# Patient Record
Sex: FEMALE | Race: WHITE
[De-identification: names, ages, dates, MRNs, and addresses within clinical notes are randomized per-mention and may not be internally consistent; named-entity substitution may affect disease eponyms.]

---

## 2018-05-23 ENCOUNTER — HOSPITAL ENCOUNTER (EMERGENCY)
Dept: HOSPITAL 94 - ER | Age: 83
Discharge: HOME | End: 2018-05-23
Payer: MEDICARE

## 2018-05-23 VITALS — DIASTOLIC BLOOD PRESSURE: 52 MMHG | SYSTOLIC BLOOD PRESSURE: 105 MMHG

## 2018-05-23 VITALS — HEIGHT: 61 IN | BODY MASS INDEX: 19.98 KG/M2 | WEIGHT: 105.82 LBS

## 2018-05-23 DIAGNOSIS — R00.2: Primary | ICD-10-CM

## 2018-05-23 DIAGNOSIS — I10: ICD-10-CM

## 2018-05-23 DIAGNOSIS — E11.9: ICD-10-CM

## 2018-05-23 DIAGNOSIS — I48.91: ICD-10-CM

## 2018-05-23 DIAGNOSIS — Z88.0: ICD-10-CM

## 2018-05-23 DIAGNOSIS — Z90.710: ICD-10-CM

## 2018-05-23 DIAGNOSIS — Z88.5: ICD-10-CM

## 2018-05-23 DIAGNOSIS — Z79.899: ICD-10-CM

## 2018-05-23 LAB
ALBUMIN SERPL BCP-MCNC: 3.7 G/DL (ref 3.4–5)
ALBUMIN/GLOB SERPL: 1.2 {RATIO} (ref 1.1–1.5)
ALP SERPL-CCNC: 70 IU/L (ref 46–116)
ALT SERPL W P-5'-P-CCNC: 47 U/L (ref 12–78)
ANION GAP SERPL CALCULATED.3IONS-SCNC: 8 MMOL/L (ref 8–16)
APTT PPP: 24 SECONDS (ref 22–32)
AST SERPL W P-5'-P-CCNC: 30 U/L (ref 10–37)
BASOPHILS # BLD AUTO: 0.1 X10'3 (ref 0–0.2)
BASOPHILS NFR BLD AUTO: 0.6 % (ref 0–1)
BILIRUB SERPL-MCNC: 0.5 MG/DL (ref 0.1–1)
BUN SERPL-MCNC: 23 MG/DL (ref 7–18)
BUN/CREAT SERPL: 25.8 (ref 6.6–38)
CALCIUM SERPL-MCNC: 9.2 MG/DL (ref 8.5–10.1)
CHLORIDE SERPL-SCNC: 105 MMOL/L (ref 99–107)
CO2 SERPL-SCNC: 29.2 MMOL/L (ref 24–32)
CREAT SERPL-MCNC: 0.89 MG/DL (ref 0.4–0.9)
EOSINOPHIL # BLD AUTO: 0.1 X10'3 (ref 0–0.9)
EOSINOPHIL NFR BLD AUTO: 1.4 % (ref 0–6)
ERYTHROCYTE [DISTWIDTH] IN BLOOD BY AUTOMATED COUNT: 13.7 % (ref 11.5–14.5)
ETHANOL SERPL-MCNC: < 0.01 GM/DL (ref 0–0.01)
GFR SERPL CREATININE-BSD FRML MDRD: 61 ML/MIN
GLUCOSE SERPL-MCNC: 141 MG/DL (ref 70–104)
HCT VFR BLD AUTO: 47.6 % (ref 35–45)
HGB BLD-MCNC: 16.1 G/DL (ref 12–16)
INR PPP: 1 INR
LYMPHOCYTES # BLD AUTO: 2 X10'3 (ref 1.1–4.8)
LYMPHOCYTES NFR BLD AUTO: 25.7 % (ref 21–51)
MAGNESIUM SERPL-MCNC: 2.3 MG/DL (ref 1.5–2.4)
MCH RBC QN AUTO: 31.6 PG (ref 27–31)
MCHC RBC AUTO-ENTMCNC: 33.7 % (ref 33–36.5)
MCV RBC AUTO: 93.7 FL (ref 78–98)
MONOCYTES # BLD AUTO: 0.6 X10'3 (ref 0–0.9)
MONOCYTES NFR BLD AUTO: 7.5 % (ref 2–12)
NEUTROPHILS # BLD AUTO: 5.1 X10'3 (ref 1.8–7.7)
NEUTROPHILS NFR BLD AUTO: 64.8 % (ref 42–75)
PLATELET # BLD AUTO: 287 X10'3 (ref 140–440)
PMV BLD AUTO: 7.6 FL (ref 7.4–10.4)
POTASSIUM SERPL-SCNC: 4.3 MMOL/L (ref 3.5–5.1)
PROT SERPL-MCNC: 6.9 G/DL (ref 6.4–8.2)
PROTHROMBIN TIME: 10.2 SECONDS (ref 9–12)
RBC # BLD AUTO: 5.09 X10'6 (ref 4.2–5.6)
SODIUM SERPL-SCNC: 142 MMOL/L (ref 135–145)
WBC # BLD AUTO: 7.9 X10'3 (ref 4.5–11)

## 2018-05-23 PROCEDURE — 71045 X-RAY EXAM CHEST 1 VIEW: CPT

## 2018-05-23 PROCEDURE — 36415 COLL VENOUS BLD VENIPUNCTURE: CPT

## 2018-05-23 PROCEDURE — 92960 CARDIOVERSION ELECTRIC EXT: CPT

## 2018-05-23 PROCEDURE — 80053 COMPREHEN METABOLIC PANEL: CPT

## 2018-05-23 PROCEDURE — 85730 THROMBOPLASTIN TIME PARTIAL: CPT

## 2018-05-23 PROCEDURE — 96374 THER/PROPH/DIAG INJ IV PUSH: CPT

## 2018-05-23 PROCEDURE — 85610 PROTHROMBIN TIME: CPT

## 2018-05-23 PROCEDURE — 85025 COMPLETE CBC W/AUTO DIFF WBC: CPT

## 2018-05-23 PROCEDURE — 84484 ASSAY OF TROPONIN QUANT: CPT

## 2018-05-23 PROCEDURE — 83735 ASSAY OF MAGNESIUM: CPT

## 2018-05-23 PROCEDURE — 93005 ELECTROCARDIOGRAM TRACING: CPT

## 2018-05-23 PROCEDURE — 96376 TX/PRO/DX INJ SAME DRUG ADON: CPT

## 2018-05-23 PROCEDURE — 99291 CRITICAL CARE FIRST HOUR: CPT

## 2018-05-23 PROCEDURE — 80320 DRUG SCREEN QUANTALCOHOLS: CPT

## 2018-05-23 RX ADMIN — METOPROLOL TARTRATE SCH MG: 5 INJECTION INTRAVENOUS at 07:45

## 2018-05-23 RX ADMIN — METOPROLOL TARTRATE SCH MG: 5 INJECTION INTRAVENOUS at 07:59

## 2018-05-23 RX ADMIN — METOPROLOL TARTRATE SCH MG: 5 INJECTION INTRAVENOUS at 10:37

## 2018-05-23 RX ADMIN — METOPROLOL TARTRATE SCH MG: 5 INJECTION INTRAVENOUS at 09:32

## 2018-05-23 RX ADMIN — METOPROLOL TARTRATE SCH MG: 5 INJECTION INTRAVENOUS at 07:29

## 2019-04-11 ENCOUNTER — HOSPITAL ENCOUNTER (INPATIENT)
Dept: HOSPITAL 94 - ER | Age: 84
DRG: 246 | End: 2019-04-11
Attending: INTERNAL MEDICINE | Admitting: INTERNAL MEDICINE
Payer: MEDICARE

## 2019-04-11 VITALS — DIASTOLIC BLOOD PRESSURE: 34 MMHG | SYSTOLIC BLOOD PRESSURE: 51 MMHG

## 2019-04-11 VITALS — DIASTOLIC BLOOD PRESSURE: 23 MMHG | SYSTOLIC BLOOD PRESSURE: 34 MMHG

## 2019-04-11 VITALS — SYSTOLIC BLOOD PRESSURE: 49 MMHG | DIASTOLIC BLOOD PRESSURE: 34 MMHG

## 2019-04-11 VITALS — HEIGHT: 61 IN | BODY MASS INDEX: 21.02 KG/M2 | WEIGHT: 111.33 LBS

## 2019-04-11 VITALS — SYSTOLIC BLOOD PRESSURE: 34 MMHG | DIASTOLIC BLOOD PRESSURE: 24 MMHG

## 2019-04-11 DIAGNOSIS — Z90.710: ICD-10-CM

## 2019-04-11 DIAGNOSIS — R57.0: ICD-10-CM

## 2019-04-11 DIAGNOSIS — I10: ICD-10-CM

## 2019-04-11 DIAGNOSIS — I21.19: Primary | ICD-10-CM

## 2019-04-11 DIAGNOSIS — I48.2: ICD-10-CM

## 2019-04-11 DIAGNOSIS — Z90.49: ICD-10-CM

## 2019-04-11 DIAGNOSIS — Z88.5: ICD-10-CM

## 2019-04-11 DIAGNOSIS — E11.9: ICD-10-CM

## 2019-04-11 DIAGNOSIS — J96.00: ICD-10-CM

## 2019-04-11 DIAGNOSIS — Z91.041: ICD-10-CM

## 2019-04-11 DIAGNOSIS — Z88.0: ICD-10-CM

## 2019-04-11 DIAGNOSIS — Z79.899: ICD-10-CM

## 2019-04-11 DIAGNOSIS — Z88.8: ICD-10-CM

## 2019-04-11 LAB
ALBUMIN SERPL BCP-MCNC: 3 G/DL (ref 3.4–5)
ALBUMIN/GLOB SERPL: 1.2 {RATIO} (ref 1.1–1.5)
ALP SERPL-CCNC: 99 IU/L (ref 46–116)
ALT SERPL W P-5'-P-CCNC: 229 U/L (ref 12–78)
ANION GAP SERPL CALCULATED.3IONS-SCNC: 19 MMOL/L (ref 8–16)
AST SERPL W P-5'-P-CCNC: 258 U/L (ref 10–37)
BASE EXCESS BLDA CALC-SCNC: -21.6 MMOL/L (ref -2–3)
BASOPHILS # BLD AUTO: 0.1 X10'3 (ref 0–0.2)
BASOPHILS NFR BLD AUTO: 1.2 % (ref 0–1)
BILIRUB SERPL-MCNC: 0.4 MG/DL (ref 0.1–1)
BODY TEMPERATURE: 37
BUN SERPL-MCNC: 9 MG/DL (ref 7–18)
BUN/CREAT SERPL: 7.6 (ref 6.6–38)
CALCIUM SERPL-MCNC: 9 MG/DL (ref 8.5–10.1)
CHLORIDE SERPL-SCNC: 105 MMOL/L (ref 99–107)
CO2 SERPL-SCNC: 17.2 MMOL/L (ref 24–32)
COHGB MFR BLDA: 0.2 % (ref 0.5–1.5)
CREAT SERPL-MCNC: 1.19 MG/DL (ref 0.4–0.9)
EOSINOPHIL # BLD AUTO: 0 X10'3 (ref 0–0.9)
EOSINOPHIL NFR BLD AUTO: 0.6 % (ref 0–6)
ERYTHROCYTE [DISTWIDTH] IN BLOOD BY AUTOMATED COUNT: 14.1 % (ref 11.5–14.5)
GFR SERPL CREATININE-BSD FRML MDRD: 43 ML/MIN
GLUCOSE SERPL-MCNC: 293 MG/DL (ref 70–104)
HCO3 BLDA-SCNC: 8.1 MMOL/L (ref 22–26)
HCT VFR BLD AUTO: 38.7 % (ref 35–45)
HGB BLD-MCNC: 12.8 G/DL (ref 12–16)
HGB BLDA-MCNC: 8.3 G/DL (ref 12–16)
INR PPP: 1.1 INR
LYMPHOCYTES # BLD AUTO: 3.5 X10'3 (ref 1.1–4.8)
LYMPHOCYTES NFR BLD AUTO: 46 % (ref 21–51)
MAGNESIUM SERPL-MCNC: 2.3 MG/DL (ref 1.5–2.4)
MCH RBC QN AUTO: 31.1 PG (ref 27–31)
MCHC RBC AUTO-ENTMCNC: 33.1 G/DL (ref 33–36.5)
MCV RBC AUTO: 93.9 FL (ref 78–98)
METHGB MFR BLDA: 0.3 % (ref 0.3–1.12)
MONOCYTES # BLD AUTO: 0.4 X10'3 (ref 0–0.9)
MONOCYTES NFR BLD AUTO: 5 % (ref 2–12)
NEUTROPHILS # BLD AUTO: 3.6 X10'3 (ref 1.8–7.7)
NEUTROPHILS NFR BLD AUTO: 47.2 % (ref 42–75)
O2/TOTAL GAS SETTING VFR VENT: 100 MMHG/%
OXYHGB MFR BLDA: 86.7 % (ref 94–100)
PCO2 TEMP ADJ BLDA: 33.4 MMHG (ref 32–45)
PEEP RESPIRATORY: 5 CM H2O
PH TEMP ADJ BLDA: 7 [PH] (ref 7.35–7.45)
PLATELET # BLD AUTO: 279 X10'3 (ref 140–440)
PMV BLD AUTO: 7.8 FL (ref 7.4–10.4)
PO2 TEMP ADJ BLD: 76.7 MMHG (ref 83–108)
POTASSIUM SERPL-SCNC: 3.7 MMOL/L (ref 3.5–5.1)
PROT SERPL-MCNC: 5.6 G/DL (ref 6.4–8.2)
PROTHROMBIN TIME: 11.1 SECONDS (ref 9–12)
RBC # BLD AUTO: 4.12 X10'6 (ref 4.2–5.6)
RESP RATE 1H: 16 B/MIN
RESP RATE RESTING: 16 B/MIN
SAO2 % BLDA: 87.1 % (ref 95–98)
SODIUM SERPL-SCNC: 141 MMOL/L (ref 135–145)
SPONT VT COMPRES GAS VOL SET UNCOR VENT: 450 ML
VOL.EXP/M/BW ON VENT: 8 L/MIN
WBC # BLD AUTO: 7.7 X10'3 (ref 4.5–11)

## 2019-04-11 PROCEDURE — 92953 TEMPORARY EXTERNAL PACING: CPT

## 2019-04-11 PROCEDURE — 96368 THER/DIAG CONCURRENT INF: CPT

## 2019-04-11 PROCEDURE — 94002 VENT MGMT INPAT INIT DAY: CPT

## 2019-04-11 PROCEDURE — 33210 INSERT ELECTRD/PM CATH SNGL: CPT

## 2019-04-11 PROCEDURE — 36600 WITHDRAWAL OF ARTERIAL BLOOD: CPT

## 2019-04-11 PROCEDURE — 71045 X-RAY EXAM CHEST 1 VIEW: CPT

## 2019-04-11 PROCEDURE — 0BH17EZ INSERTION OF ENDOTRACHEAL AIRWAY INTO TRACHEA, VIA NATURAL OR ARTIFICIAL OPENING: ICD-10-PCS | Performed by: EMERGENCY MEDICINE

## 2019-04-11 PROCEDURE — 36415 COLL VENOUS BLD VENIPUNCTURE: CPT

## 2019-04-11 PROCEDURE — 96375 TX/PRO/DX INJ NEW DRUG ADDON: CPT

## 2019-04-11 PROCEDURE — 85025 COMPLETE CBC W/AUTO DIFF WBC: CPT

## 2019-04-11 PROCEDURE — 80053 COMPREHEN METABOLIC PANEL: CPT

## 2019-04-11 PROCEDURE — B2111ZZ FLUOROSCOPY OF MULTIPLE CORONARY ARTERIES USING LOW OSMOLAR CONTRAST: ICD-10-PCS | Performed by: INTERNAL MEDICINE

## 2019-04-11 PROCEDURE — 83880 ASSAY OF NATRIURETIC PEPTIDE: CPT

## 2019-04-11 PROCEDURE — 027034Z DILATION OF CORONARY ARTERY, ONE ARTERY WITH DRUG-ELUTING INTRALUMINAL DEVICE, PERCUTANEOUS APPROACH: ICD-10-PCS | Performed by: INTERNAL MEDICINE

## 2019-04-11 PROCEDURE — B2151ZZ FLUOROSCOPY OF LEFT HEART USING LOW OSMOLAR CONTRAST: ICD-10-PCS | Performed by: INTERNAL MEDICINE

## 2019-04-11 PROCEDURE — 83735 ASSAY OF MAGNESIUM: CPT

## 2019-04-11 PROCEDURE — 84443 ASSAY THYROID STIM HORMONE: CPT

## 2019-04-11 PROCEDURE — 92950 HEART/LUNG RESUSCITATION CPR: CPT

## 2019-04-11 PROCEDURE — 02703ZZ DILATION OF CORONARY ARTERY, ONE ARTERY, PERCUTANEOUS APPROACH: ICD-10-PCS | Performed by: INTERNAL MEDICINE

## 2019-04-11 PROCEDURE — 87070 CULTURE OTHR SPECIMN AEROBIC: CPT

## 2019-04-11 PROCEDURE — 84484 ASSAY OF TROPONIN QUANT: CPT

## 2019-04-11 PROCEDURE — 5A12012 PERFORMANCE OF CARDIAC OUTPUT, SINGLE, MANUAL: ICD-10-PCS | Performed by: INTERNAL MEDICINE

## 2019-04-11 PROCEDURE — 93005 ELECTROCARDIOGRAM TRACING: CPT

## 2019-04-11 PROCEDURE — 4A023N7 MEASUREMENT OF CARDIAC SAMPLING AND PRESSURE, LEFT HEART, PERCUTANEOUS APPROACH: ICD-10-PCS | Performed by: INTERNAL MEDICINE

## 2019-04-11 PROCEDURE — 96376 TX/PRO/DX INJ SAME DRUG ADON: CPT

## 2019-04-11 PROCEDURE — 94760 N-INVAS EAR/PLS OXIMETRY 1: CPT

## 2019-04-11 PROCEDURE — 93306 TTE W/DOPPLER COMPLETE: CPT

## 2019-04-11 PROCEDURE — 5A1935Z RESPIRATORY VENTILATION, LESS THAN 24 CONSECUTIVE HOURS: ICD-10-PCS | Performed by: EMERGENCY MEDICINE

## 2019-04-11 PROCEDURE — 96365 THER/PROPH/DIAG IV INF INIT: CPT

## 2019-04-11 PROCEDURE — 93458 L HRT ARTERY/VENTRICLE ANGIO: CPT

## 2019-04-11 PROCEDURE — 85610 PROTHROMBIN TIME: CPT

## 2019-04-11 PROCEDURE — 99291 CRITICAL CARE FIRST HOUR: CPT

## 2019-04-11 PROCEDURE — 5A09357 ASSISTANCE WITH RESPIRATORY VENTILATION, LESS THAN 24 CONSECUTIVE HOURS, CONTINUOUS POSITIVE AIRWAY PRESSURE: ICD-10-PCS | Performed by: EMERGENCY MEDICINE

## 2019-04-11 PROCEDURE — 82803 BLOOD GASES ANY COMBINATION: CPT

## 2019-04-11 PROCEDURE — 85018 HEMOGLOBIN: CPT

## 2019-04-11 NOTE — NUR
received pt from cath lab around 8am. unable to get patients pressure up. Pt on 10 
mcg/kg/min. Pt not tolerating dose, notified Dr. Polk and attempted to wean Dopamine 
down. Pt tachy and hypotensive. Dr. Polk notified RN to continue to increase levophed to 
max dose of 100mcg/min (see titration on IV flowsheet). Pt PEA Code Blue started. MD at 
bedside See flowsheet. 0101 Dr. Polk called cardiac time of death. Rhythm on monitor PEA 
(strip printed). After Dr. Polk called time of death, shortly afterward family reported 
to bedside.  contacted. SW to bedside. SW notified pt children and would like to be 
involved with the decisions at mortuary.

## 2019-04-11 NOTE — NUR
DOPAMINE TO 10MCG/MIN

-------------------------------------------------------------------------------

Addendum: 04/11/19 at 0611 by APETERSON

-------------------------------------------------------------------------------

11MCG/MIN*

## 2019-04-11 NOTE — NUR
PT WITH AUTOMATIC BP READING 56/44, ATTEMPTED MANUAL BP WITH NO LUCK.  PT WITH 
COOL EXTREMITIES. DR. PEÑA AT BEDSIDE. PT ATTACHED TO ZOLL MONITOR.  CATH 
LAB HAS BEEN CALLED IN.  NITRO GTT STOPPED.

## 2019-04-11 NOTE — NUR
CALLED NURSING SUPERVISOR ABOUT ETA OF CATH LAB AND SAID HE WAS TRYING TO GET 
AHOLD OF ONE PERSON FROM THE CATH LAB.